# Patient Record
Sex: FEMALE | Race: BLACK OR AFRICAN AMERICAN | NOT HISPANIC OR LATINO
[De-identification: names, ages, dates, MRNs, and addresses within clinical notes are randomized per-mention and may not be internally consistent; named-entity substitution may affect disease eponyms.]

---

## 2022-08-23 PROBLEM — Z00.00 ENCOUNTER FOR PREVENTIVE HEALTH EXAMINATION: Status: ACTIVE | Noted: 2022-08-23

## 2022-08-25 ENCOUNTER — APPOINTMENT (OUTPATIENT)
Dept: OTOLARYNGOLOGY | Facility: CLINIC | Age: 42
End: 2022-08-25

## 2022-08-25 ENCOUNTER — NON-APPOINTMENT (OUTPATIENT)
Age: 42
End: 2022-08-25

## 2022-08-25 VITALS
WEIGHT: 165 LBS | SYSTOLIC BLOOD PRESSURE: 157 MMHG | BODY MASS INDEX: 30.36 KG/M2 | HEART RATE: 79 BPM | DIASTOLIC BLOOD PRESSURE: 86 MMHG | TEMPERATURE: 97.3 F | OXYGEN SATURATION: 97 % | HEIGHT: 62 IN

## 2022-08-25 DIAGNOSIS — Z82.49 FAMILY HISTORY OF ISCHEMIC HEART DISEASE AND OTHER DISEASES OF THE CIRCULATORY SYSTEM: ICD-10-CM

## 2022-08-25 DIAGNOSIS — Z83.3 FAMILY HISTORY OF DIABETES MELLITUS: ICD-10-CM

## 2022-08-25 DIAGNOSIS — Z80.9 FAMILY HISTORY OF MALIGNANT NEOPLASM, UNSPECIFIED: ICD-10-CM

## 2022-08-25 DIAGNOSIS — H61.23 IMPACTED CERUMEN, BILATERAL: ICD-10-CM

## 2022-08-25 DIAGNOSIS — Z86.39 PERSONAL HISTORY OF OTHER ENDOCRINE, NUTRITIONAL AND METABOLIC DISEASE: ICD-10-CM

## 2022-08-25 DIAGNOSIS — Z78.9 OTHER SPECIFIED HEALTH STATUS: ICD-10-CM

## 2022-08-25 DIAGNOSIS — D64.9 ANEMIA, UNSPECIFIED: ICD-10-CM

## 2022-08-25 PROCEDURE — 69210 REMOVE IMPACTED EAR WAX UNI: CPT

## 2022-08-25 PROCEDURE — 10061 I&D ABSCESS COMP/MULTIPLE: CPT

## 2022-08-25 PROCEDURE — 99204 OFFICE O/P NEW MOD 45 MIN: CPT | Mod: 25

## 2022-08-25 RX ORDER — AMOXICILLIN AND CLAVULANATE POTASSIUM 875; 125 MG/1; MG/1
875-125 TABLET, COATED ORAL
Qty: 14 | Refills: 0 | Status: ACTIVE | COMMUNITY
Start: 2022-08-25 | End: 1900-01-01

## 2022-08-25 RX ORDER — PREDNISONE 50 MG/1
50 TABLET ORAL
Qty: 5 | Refills: 0 | Status: ACTIVE | COMMUNITY
Start: 2022-08-25 | End: 1900-01-01

## 2022-08-25 NOTE — PROCEDURE
[FreeTextEntry1] : I&D R preauricular abscess [FreeTextEntry2] : R preauricular abscess [FreeTextEntry3] : The risks & benefits of incision & drainage were discussed and the patient consented in writing. A time-out was performed. After an alcohol prep, he area under & around the abscess was injected with a judicious amount of 1% lidocaine w/ epi and time was allowed for this to take effect. The area was draped and a betadine prep was performed. The cavity was then sharply opened with a blade and probed with a hemostat revealing a large phlegmon with areas of abscess; a culture was taken, purulent contents were evacuated and the cavity was irrigated with sterile saline. It was then packed w/ 1/4" iodoform packing tape and dressed with 4x4s. This was tolerated well.\par Care was discussed and instructions & supplies were given for BID packing changes.

## 2022-08-25 NOTE — ASSESSMENT
[FreeTextEntry1] : We discussed the combination phlegmon/abscess nature of the findings; since this was not a simple abscess with proceed w/ abx & steroids. RTC 4 days; she expresses understanding of how to have someone perform dressing changes.

## 2022-08-25 NOTE — PHYSICAL EXAM
[Binocular Microscopic Exam] : Binocular microscopic exam was performed [FreeTextEntry6] : 3.5 cm L preauricular fluctuant mass  [FreeTextEntry8] : obstructing cerumen removed with suction [FreeTextEntry9] : obstructing cerumen removed with suction [Normal] : assessment of respiratory effort is normal

## 2022-08-25 NOTE — HISTORY OF PRESENT ILLNESS
[de-identified] : Hx of excision of a L preauricular pit and now presents w/ a R preauricular pit that became inflamed ~3 wks ago- she was prescribed amox/clav for 10 days with some improvement but then it swelled up 3d ago and since then has decreased in size somewhat. Only mild pain at this point. No abx now. No hx surgery on the R.

## 2022-08-29 ENCOUNTER — APPOINTMENT (OUTPATIENT)
Dept: OTOLARYNGOLOGY | Facility: CLINIC | Age: 42
End: 2022-08-29

## 2022-08-29 VITALS
SYSTOLIC BLOOD PRESSURE: 146 MMHG | HEART RATE: 91 BPM | OXYGEN SATURATION: 97 % | BODY MASS INDEX: 30.36 KG/M2 | WEIGHT: 165 LBS | HEIGHT: 62 IN | TEMPERATURE: 97.6 F | DIASTOLIC BLOOD PRESSURE: 86 MMHG

## 2022-08-29 LAB — EAR NOSE AND THROAT CULTURE: ABNORMAL

## 2022-08-29 PROCEDURE — 99024 POSTOP FOLLOW-UP VISIT: CPT

## 2022-08-29 NOTE — REASON FOR VISIT
[Subsequent Evaluation] : a subsequent evaluation for [FreeTextEntry2] : f/u I&D of a large R supra-auricular abscess

## 2022-08-29 NOTE — ASSESSMENT
[FreeTextEntry1] : On questioning she's been packing the cavity very tightly and will lighten up a bit. We discussed when to stop packing. RTC 3 wks sooner prn

## 2022-08-29 NOTE — HISTORY OF PRESENT ILLNESS
[de-identified] : Hx of excision of a L preauricular pit and now f/u I&D of a R preauricular pit that abscessed widely. She's been doing BID packing changes with significant relief. No pain.

## 2022-09-19 ENCOUNTER — APPOINTMENT (OUTPATIENT)
Dept: OTOLARYNGOLOGY | Facility: CLINIC | Age: 42
End: 2022-09-19

## 2022-09-19 VITALS
BODY MASS INDEX: 30.36 KG/M2 | DIASTOLIC BLOOD PRESSURE: 93 MMHG | HEART RATE: 84 BPM | WEIGHT: 165 LBS | HEIGHT: 62 IN | SYSTOLIC BLOOD PRESSURE: 159 MMHG | TEMPERATURE: 98.4 F | OXYGEN SATURATION: 98 %

## 2022-09-19 PROCEDURE — 92557 COMPREHENSIVE HEARING TEST: CPT

## 2022-09-19 PROCEDURE — 92567 TYMPANOMETRY: CPT

## 2022-09-19 PROCEDURE — 99214 OFFICE O/P EST MOD 30 MIN: CPT | Mod: 25

## 2022-09-19 PROCEDURE — 92504 EAR MICROSCOPY EXAMINATION: CPT

## 2022-09-19 NOTE — HISTORY OF PRESENT ILLNESS
[de-identified] : Hx of excision of a L preauricular pit and now f/u I&D of a R preauricular pit that abscessed widely. No further pain; hearing seems ok & no tinnitus.

## 2022-09-19 NOTE — DATA REVIEWED
[de-identified] : today: normal hearing AU\par - immitance testing w/ type A AU\par  [de-identified] : 8/22 cx: few Provotella, few Peptoniphilus

## 2022-09-19 NOTE — REASON FOR VISIT
[Subsequent Evaluation] : a subsequent evaluation for [FreeTextEntry2] : f/u large R periauricular abscess

## 2022-09-19 NOTE — PHYSICAL EXAM
[Binocular Microscopic Exam] : Binocular microscopic exam was performed [Normal] : the left middle ear was normal [FreeTextEntry6] : thick scar w/ underlying 1 cm prominence anterior to the R helical root

## 2022-09-23 LAB
ALBUMIN SERPL ELPH-MCNC: 4.3 G/DL
ALP BLD-CCNC: 77 U/L
ALT SERPL-CCNC: 15 U/L
ANION GAP SERPL CALC-SCNC: 12 MMOL/L
AST SERPL-CCNC: 19 U/L
BASOPHILS # BLD AUTO: 0.05 K/UL
BASOPHILS NFR BLD AUTO: 0.7 %
BILIRUB SERPL-MCNC: 0.2 MG/DL
BUN SERPL-MCNC: 8 MG/DL
CALCIUM SERPL-MCNC: 9.5 MG/DL
CHLORIDE SERPL-SCNC: 105 MMOL/L
CO2 SERPL-SCNC: 21 MMOL/L
CREAT SERPL-MCNC: 0.63 MG/DL
EGFR: 116 ML/MIN/1.73M2
EOSINOPHIL # BLD AUTO: 0.15 K/UL
EOSINOPHIL NFR BLD AUTO: 2.1 %
GLUCOSE SERPL-MCNC: 103 MG/DL
HCT VFR BLD CALC: 36.6 %
HGB BLD-MCNC: 10.8 G/DL
IMM GRANULOCYTES NFR BLD AUTO: 0.3 %
INR PPP: 1.08 RATIO
LYMPHOCYTES # BLD AUTO: 1.72 K/UL
LYMPHOCYTES NFR BLD AUTO: 24.2 %
MAN DIFF?: NORMAL
MCHC RBC-ENTMCNC: 27.3 PG
MCHC RBC-ENTMCNC: 29.5 GM/DL
MCV RBC AUTO: 92.7 FL
MONOCYTES # BLD AUTO: 0.49 K/UL
MONOCYTES NFR BLD AUTO: 6.9 %
NEUTROPHILS # BLD AUTO: 4.68 K/UL
NEUTROPHILS NFR BLD AUTO: 65.8 %
PLATELET # BLD AUTO: 585 K/UL
POTASSIUM SERPL-SCNC: 4.2 MMOL/L
PROT SERPL-MCNC: 7.3 G/DL
PT BLD: 12.7 SEC
RBC # BLD: 3.95 M/UL
RBC # FLD: 14.4 %
SODIUM SERPL-SCNC: 139 MMOL/L
WBC # FLD AUTO: 7.11 K/UL

## 2022-10-07 ENCOUNTER — APPOINTMENT (OUTPATIENT)
Dept: CT IMAGING | Facility: CLINIC | Age: 42
End: 2022-10-07

## 2022-11-16 ENCOUNTER — TRANSCRIPTION ENCOUNTER (OUTPATIENT)
Age: 42
End: 2022-11-16

## 2022-11-16 ENCOUNTER — RESULT REVIEW (OUTPATIENT)
Age: 42
End: 2022-11-16

## 2022-11-16 ENCOUNTER — OUTPATIENT (OUTPATIENT)
Dept: OUTPATIENT SERVICES | Facility: HOSPITAL | Age: 42
LOS: 1 days | End: 2022-11-16

## 2022-11-16 ENCOUNTER — APPOINTMENT (OUTPATIENT)
Dept: CT IMAGING | Facility: CLINIC | Age: 42
End: 2022-11-16

## 2022-11-16 PROCEDURE — 70481 CT ORBIT/EAR/FOSSA W/DYE: CPT | Mod: 26

## 2022-12-12 ENCOUNTER — APPOINTMENT (OUTPATIENT)
Dept: OTOLARYNGOLOGY | Facility: CLINIC | Age: 42
End: 2022-12-12

## 2022-12-12 VITALS
DIASTOLIC BLOOD PRESSURE: 68 MMHG | OXYGEN SATURATION: 100 % | HEART RATE: 76 BPM | TEMPERATURE: 98 F | HEIGHT: 62 IN | BODY MASS INDEX: 30.73 KG/M2 | WEIGHT: 167 LBS | SYSTOLIC BLOOD PRESSURE: 154 MMHG

## 2022-12-12 DIAGNOSIS — Q18.1 PREAURICULAR SINUS AND CYST: ICD-10-CM

## 2022-12-12 PROCEDURE — 92504 EAR MICROSCOPY EXAMINATION: CPT

## 2022-12-12 PROCEDURE — 99213 OFFICE O/P EST LOW 20 MIN: CPT | Mod: 25

## 2022-12-12 NOTE — DATA REVIEWED
[de-identified] : 9/22: normal hearing AU\par - immitance testing w/ type A AU\par  [de-identified] : CT t-bones: inflammatory soft tissue fullness in the suspected area w/ a sinus tract

## 2022-12-12 NOTE — HISTORY OF PRESENT ILLNESS
[de-identified] : Hx of excision of a L preauricular pit and now f/u a CT subsequent to I&D of a R preauricular pit that abscessed widely. No further pain; hearing seems ok & no tinnitus.

## 2022-12-12 NOTE — PHYSICAL EXAM
[Binocular Microscopic Exam] : Binocular microscopic exam was performed [Normal] : the left middle ear was normal [FreeTextEntry6] : thick scar w/ underlying 1 cm prominence anterior to the R helical root- unchanged [FreeTextEntry7] : well-healed pit excision site

## 2023-01-09 ENCOUNTER — APPOINTMENT (OUTPATIENT)
Dept: OTOLARYNGOLOGY | Facility: CLINIC | Age: 43
End: 2023-01-09

## 2023-01-18 ENCOUNTER — APPOINTMENT (OUTPATIENT)
Age: 43
End: 2023-01-18

## 2023-01-23 ENCOUNTER — APPOINTMENT (OUTPATIENT)
Dept: OTOLARYNGOLOGY | Facility: CLINIC | Age: 43
End: 2023-01-23

## 2023-11-05 ENCOUNTER — EMERGENCY (EMERGENCY)
Facility: HOSPITAL | Age: 43
LOS: 1 days | Discharge: HOME | End: 2023-11-05
Attending: EMERGENCY MEDICINE | Admitting: EMERGENCY MEDICINE
Payer: COMMERCIAL

## 2023-11-05 VITALS
SYSTOLIC BLOOD PRESSURE: 156 MMHG | DIASTOLIC BLOOD PRESSURE: 98 MMHG | WEIGHT: 179.9 LBS | HEART RATE: 98 BPM | RESPIRATION RATE: 16 BRPM | OXYGEN SATURATION: 100 % | HEIGHT: 62 IN | TEMPERATURE: 98 F

## 2023-11-05 DIAGNOSIS — H92.01 OTALGIA, RIGHT EAR: ICD-10-CM

## 2023-11-05 DIAGNOSIS — Z86.2 PERSONAL HISTORY OF DISEASES OF THE BLOOD AND BLOOD-FORMING ORGANS AND CERTAIN DISORDERS INVOLVING THE IMMUNE MECHANISM: ICD-10-CM

## 2023-11-05 DIAGNOSIS — Z87.42 PERSONAL HISTORY OF OTHER DISEASES OF THE FEMALE GENITAL TRACT: ICD-10-CM

## 2023-11-05 DIAGNOSIS — R05.1 ACUTE COUGH: ICD-10-CM

## 2023-11-05 DIAGNOSIS — Z87.2 PERSONAL HISTORY OF DISEASES OF THE SKIN AND SUBCUTANEOUS TISSUE: ICD-10-CM

## 2023-11-05 DIAGNOSIS — Q18.1 PREAURICULAR SINUS AND CYST: ICD-10-CM

## 2023-11-05 DIAGNOSIS — H93.8X1 OTHER SPECIFIED DISORDERS OF RIGHT EAR: ICD-10-CM

## 2023-11-05 PROCEDURE — 99283 EMERGENCY DEPT VISIT LOW MDM: CPT

## 2023-11-05 RX ADMIN — Medication 1 TABLET(S): at 01:50

## 2023-11-05 NOTE — ED PROVIDER NOTE - PHYSICAL EXAMINATION
Physical Exam:    CONSTITUTIONAL:  Generally well appearing, no acute distress, alert, awake and oriented  HEENT:  Moist mucous membranes, normal voice  PULM:  No accessory muscle use, speaking full sentences  SKIN:  Normal in appearance, normal color    2x2cm area of sofr fluctuance and ttp just anterior/superior to R ear. no pain w/ ear manipulation. central scar (pt states this is from last incision to drain the abscess). No crepitus, firmness, induration. Skin is normal temp. No erythema/warmth. No obvious skin breaks.   TM/EAC wnl. Physical Exam:    CONSTITUTIONAL:  Generally well appearing, no acute distress, alert, awake and oriented  HEENT:  Moist mucous membranes, normal voice  PULM:  No accessory muscle use, speaking full sentences  SKIN:  Normal in appearance, normal color    2x2cm area of soft fluctuance and minimal ttp just anterior/superior to R ear. no pain w/ ear manipulation. central scar (pt states this is from last incision to drain the abscess). No crepitus, firmness, induration. Skin is normal temp. No erythema/warmth. No obvious skin breaks.   TM/EAC wnl.

## 2023-11-05 NOTE — ED PROVIDER NOTE - NSFOLLOWUPINSTRUCTIONS_ED_ALL_ED_FT
It is unclear what exactly is causing your symptoms! You may have a cyst or a lipoma. It is important to continue following up with your doctor outside the hospital and to return to ER for: Persistent fever/vomiting, uncontrolled pain, worsening swelling, worsening breathing, worsening lightheaded, spreading redness.     Follow up with ENT. Can follow up at [facility] ([facility] Can call [phone] to schedule appointment.     Can take tylenol 650mg or motrin 600mg (May cause stomach irritation - take with food and avoid prolonged use) every 6hrs as needed for pain or fever.    Take antibiotics as prescribed for possible infection.     Follow up with primary doctor within 1-2 days.

## 2023-11-05 NOTE — ED ADULT NURSE NOTE - HISTORY OF COVID-19 VACCINATION
Individual Psychotherapy (PhD/LCSW)    3/11/2022    Site:  [place]         Therapeutic Intervention: Met with patient.  Outpatient - Insight oriented psychotherapy 45 min - CPT code 79838, Outpatient - Behavior modifying psychotherapy 45 min - CPT code 22168 and Outpatient - Supportive psychotherapy 45 min - CPT Code 44084    Chief complaint/reason for encounter: depression, anxiety and family stress             Interval history and content of current session:  Client was referred to treatment by Apryl REHMAN to address depression and anxiety.  Client arrived to session late and was fully engaged.  Client shared that her sister  yesterday.  Client was tearful at times through session which is understandable.  Client went on to discuss her estranged relationship with her family and how she wishes that her family would come together.  She shared about her son asking her never to contact him again.  Client is deeply hurt and saddened by this.  She reports that she continues to pray about it, talk to her , and attend counseling.  Client reported that she and her niece, her sister's daughter, get along well and are very supportive with none of one another. Client reports that she plans to go out to visit her niece this weekend.  Client to continue in one-to-one sessions.    Treatment plan:  · Target symptoms: depression, anxiety , work stress, family stress  · Why chosen therapy is appropriate versus another modality: relevant to diagnosis, patient responds to this modality, evidence based practice  · Outcome monitoring methods: self-report  · Therapeutic intervention type: insight oriented psychotherapy, behavior modifying psychotherapy, supportive psychotherapy    Risk parameters:  Patient reports no suicidal ideation  Patient reports no homicidal ideation  Patient reports no self-injurious behavior  Patient reports no violent behavior          CSSRS was completed:     Verbal deficits: None    Patient's  response to intervention:  The patient's response to intervention is accepting.    Progress toward goals and other mental status changes:  The patient's progress toward goals is fair , good.    Diagnosis:     ICD-10-CM ICD-9-CM   1. VANDANA (generalized anxiety disorder)  F41.1 300.02   2. Moderate episode of recurrent major depressive disorder  F33.1 296.32   3. Family conflict  Z63.8 V61.9       Plan:  individual psychotherapy and Ct to continue to see Apryl REHMAN for psych med mgt Pt to go to ED or call 911 if symptoms worsen or if she has thoughts of harming self and/or others. Pt verbalized understanding.    Return to clinic: as scheduled    Length of Service (minutes): 45      Each patient to whom he or she provides medical services by telemedicine is: (1) informed of the relationship between the physician and patient and the respective role of any other health care provider with respect to management of the patient; and (2) notified that he or she may decline to receive medical services by telemedicine and may withdraw from such care at any time.       Yes

## 2023-11-05 NOTE — ED PROVIDER NOTE - CLINICAL SUMMARY MEDICAL DECISION MAKING FREE TEXT BOX
42F PMH fibroids, anemia (2/2 fibroids), preauricular pits p/w pain/swelling to area just anterior/superior to R ear since yesterday - states this is the area of her preauricular pit and has had abscess there in the past, last drained ~1yr ago. Has minimal cough x2w. No other systemic symptoms.   Vitals wnl, exam as above. 42F PMH fibroids, anemia (2/2 fibroids), preauricular pits p/w pain/swelling to area just anterior/superior to R ear since yesterday - states this is the area of her preauricular pit and has had abscess there in the past, last drained ~1yr ago. Has minimal cough x2w. Pt states that at her baseline, she has no swelling to that area.  No other systemic symptoms.   Vitals wnl, exam as above.  I instilled 0.5cc of 1% lido w/ epi and made small incision - there is no drainage at all. very scant blood.    After questioning pt if all this swelling really just happened since yesterday, pt now states that she does have swelling at baseline but slightly worse since yesterday.  Initially, based on pt's initial description, symptoms were c/w possible abscess. However based on incision and pt now stating that this has been there a while, this is likely not abscess, but rather possible cyst/lipoma. Will cover w/ abx for possible infection.

## 2023-11-05 NOTE — ED PROVIDER NOTE - PATIENT PORTAL LINK FT
You can access the FollowMyHealth Patient Portal offered by [facility] by registering at the following website: [email or web address]. By joining "[employer]’s FollowMyHealth portal, you will also be able to view your health information using other applications (apps) compatible with our system.

## 2023-11-05 NOTE — ED PROVIDER NOTE - OBJECTIVE STATEMENT
42F PMH fibroids, anemia (2/2 fibroids), preauricular pits p/w pain/swelling to area just anterior/superior to R ear since yesterday - states this is the area of her preauricular pit and has had abscess there in the past, last drained ~1yr ago. Has minimal cough x2w. No other systemic symptoms.   Denies pain to other parts of ear, hearing changes, HA, dizziness, vision changes, focal weakness/numbness, gait changes, NVD, f/c.

## 2023-11-05 NOTE — ED ADULT NURSE NOTE - NSFALLUNIVINTERV_ED_ALL_ED
Bed/Stretcher in lowest position, wheels locked, appropriate side rails in place/Call bell, personal items and telephone in reach/Instruct patient to call for assistance before getting out of bed/chair/stretcher/Non-slip footwear applied when patient is off stretcher/Early to call system/Physically safe environment - no spills, clutter or unnecessary equipment/Purposeful proactive rounding/Room/bathroom lighting operational, light cord in reach

## 2025-03-04 ENCOUNTER — APPOINTMENT (OUTPATIENT)
Dept: OTOLARYNGOLOGY | Facility: CLINIC | Age: 45
End: 2025-03-04
Payer: COMMERCIAL

## 2025-03-04 ENCOUNTER — NON-APPOINTMENT (OUTPATIENT)
Age: 45
End: 2025-03-04

## 2025-03-04 VITALS
WEIGHT: 185.56 LBS | SYSTOLIC BLOOD PRESSURE: 160 MMHG | DIASTOLIC BLOOD PRESSURE: 90 MMHG | BODY MASS INDEX: 34.15 KG/M2 | OXYGEN SATURATION: 99 % | TEMPERATURE: 98.2 F | HEIGHT: 62 IN | HEART RATE: 77 BPM

## 2025-03-04 DIAGNOSIS — D64.9 ANEMIA, UNSPECIFIED: ICD-10-CM

## 2025-03-04 PROCEDURE — 99214 OFFICE O/P EST MOD 30 MIN: CPT | Mod: 25

## 2025-03-04 PROCEDURE — 69000 DRG XTRNL EAR ABSC/HEM SMPL: CPT | Mod: RT

## 2025-03-04 RX ORDER — LISINOPRIL 40 MG/1
40 TABLET ORAL
Refills: 0 | Status: ACTIVE | COMMUNITY

## 2025-03-10 ENCOUNTER — APPOINTMENT (OUTPATIENT)
Dept: OTOLARYNGOLOGY | Facility: CLINIC | Age: 45
End: 2025-03-10
Payer: COMMERCIAL

## 2025-03-10 VITALS
SYSTOLIC BLOOD PRESSURE: 140 MMHG | BODY MASS INDEX: 34.04 KG/M2 | OXYGEN SATURATION: 97 % | TEMPERATURE: 97.6 F | WEIGHT: 185 LBS | HEART RATE: 98 BPM | HEIGHT: 62 IN | DIASTOLIC BLOOD PRESSURE: 88 MMHG

## 2025-03-10 DIAGNOSIS — Q18.1 PREAURICULAR SINUS AND CYST: ICD-10-CM

## 2025-03-10 PROCEDURE — 99024 POSTOP FOLLOW-UP VISIT: CPT

## 2025-03-25 ENCOUNTER — APPOINTMENT (OUTPATIENT)
Dept: OTOLARYNGOLOGY | Facility: CLINIC | Age: 45
End: 2025-03-25

## 2025-04-14 ENCOUNTER — APPOINTMENT (OUTPATIENT)
Dept: OTOLARYNGOLOGY | Facility: CLINIC | Age: 45
End: 2025-04-14
Payer: COMMERCIAL

## 2025-04-14 VITALS
BODY MASS INDEX: 34.6 KG/M2 | TEMPERATURE: 97.6 F | OXYGEN SATURATION: 98 % | HEIGHT: 62 IN | DIASTOLIC BLOOD PRESSURE: 92 MMHG | WEIGHT: 188 LBS | SYSTOLIC BLOOD PRESSURE: 166 MMHG | HEART RATE: 95 BPM

## 2025-04-14 DIAGNOSIS — D64.9 ANEMIA, UNSPECIFIED: ICD-10-CM

## 2025-04-14 DIAGNOSIS — Q18.1 PREAURICULAR SINUS AND CYST: ICD-10-CM

## 2025-04-14 PROCEDURE — 92504 EAR MICROSCOPY EXAMINATION: CPT

## 2025-04-14 PROCEDURE — 99214 OFFICE O/P EST MOD 30 MIN: CPT | Mod: 25

## 2025-06-25 ENCOUNTER — APPOINTMENT (OUTPATIENT)
Dept: OTOLARYNGOLOGY | Facility: CLINIC | Age: 45
End: 2025-06-25
Payer: COMMERCIAL

## 2025-06-25 PROCEDURE — G2211 COMPLEX E/M VISIT ADD ON: CPT | Mod: NC,93

## 2025-06-25 PROCEDURE — 99215 OFFICE O/P EST HI 40 MIN: CPT | Mod: 93

## 2025-06-26 RX ORDER — BACITRACIN 500 [IU]/G
500 OINTMENT TOPICAL
Qty: 1 | Refills: 0 | Status: ACTIVE | COMMUNITY
Start: 2025-06-26 | End: 1900-01-01

## 2025-07-08 NOTE — ASU PATIENT PROFILE, ADULT - NS PREOP UNDERSTANDS INFO
No solid food/dairy/candy/gum after midnight; water allowed before 07:00am tomorrow; patient reminded to come with photo ID/insurance/credit card; dress in comfortable clothes; no jewelries/contact lens/valuable; no smoking/alcohol drinking/recreational drug use tonight; escort to have photo ID; address and callback number was given/yes

## 2025-07-08 NOTE — ASU PATIENT PROFILE, ADULT - ABLE TO REACH PT
yes Time change left on voicemail; instructed to arrive at 06:45am; No solid food/dairy/candy/gum after midnight; water allowed before 05:30am tomorrow; patient reminded to come with photo ID/insurance/credit card; dress in comfortable clothes; no jewelries/contact lens/valuable; no smoking/alcohol drinking/recreational drug use tonight; escort to have photo ID; address and callback number was given/no

## 2025-07-09 ENCOUNTER — TRANSCRIPTION ENCOUNTER (OUTPATIENT)
Age: 45
End: 2025-07-09

## 2025-07-09 ENCOUNTER — OUTPATIENT (OUTPATIENT)
Dept: OUTPATIENT SERVICES | Facility: HOSPITAL | Age: 45
LOS: 1 days | Discharge: HOME | End: 2025-07-09
Payer: COMMERCIAL

## 2025-07-09 ENCOUNTER — RESULT REVIEW (OUTPATIENT)
Age: 45
End: 2025-07-09

## 2025-07-09 ENCOUNTER — APPOINTMENT (OUTPATIENT)
Dept: OTOLARYNGOLOGY | Facility: AMBULATORY SURGERY CENTER | Age: 45
End: 2025-07-09

## 2025-07-09 VITALS
HEART RATE: 89 BPM | DIASTOLIC BLOOD PRESSURE: 82 MMHG | TEMPERATURE: 99 F | WEIGHT: 186.51 LBS | HEIGHT: 62 IN | RESPIRATION RATE: 16 BRPM | SYSTOLIC BLOOD PRESSURE: 152 MMHG | OXYGEN SATURATION: 100 %

## 2025-07-09 VITALS
RESPIRATION RATE: 16 BRPM | HEART RATE: 76 BPM | SYSTOLIC BLOOD PRESSURE: 105 MMHG | OXYGEN SATURATION: 99 % | DIASTOLIC BLOOD PRESSURE: 62 MMHG

## 2025-07-09 PROCEDURE — 11444 EXC FACE-MM B9+MARG 3.1-4 CM: CPT | Mod: GC

## 2025-07-09 PROCEDURE — 13152 CMPLX RPR E/N/E/L 2.6-7.5 CM: CPT | Mod: GC

## 2025-07-09 PROCEDURE — 88304 TISSUE EXAM BY PATHOLOGIST: CPT | Mod: 26

## 2025-07-09 DEVICE — SURGIFOAM 8 X 12.5CM X 10MM (100): Type: IMPLANTABLE DEVICE | Site: RIGHT | Status: FUNCTIONAL

## 2025-07-09 RX ORDER — LISINOPRIL 5 MG/1
1 TABLET ORAL
Refills: 0 | DISCHARGE

## 2025-07-09 RX ORDER — OXYCODONE HYDROCHLORIDE 30 MG/1
5 TABLET ORAL ONCE
Refills: 0 | Status: DISCONTINUED | OUTPATIENT
Start: 2025-07-09 | End: 2025-07-09

## 2025-07-09 RX ORDER — DIPHENHYDRAMINE HCL 12.5MG/5ML
12.5 ELIXIR ORAL ONCE
Refills: 0 | Status: DISCONTINUED | OUTPATIENT
Start: 2025-07-09 | End: 2025-07-09

## 2025-07-09 RX ORDER — APREPITANT 40 MG/1
40 CAPSULE ORAL ONCE
Refills: 0 | Status: COMPLETED | OUTPATIENT
Start: 2025-07-09 | End: 2025-07-09

## 2025-07-09 RX ORDER — ONDANSETRON HCL/PF 4 MG/2 ML
4 VIAL (ML) INJECTION ONCE
Refills: 0 | Status: DISCONTINUED | OUTPATIENT
Start: 2025-07-09 | End: 2025-07-09

## 2025-07-09 RX ORDER — FENTANYL CITRATE-0.9 % NACL/PF 100MCG/2ML
25 SYRINGE (ML) INTRAVENOUS
Refills: 0 | Status: DISCONTINUED | OUTPATIENT
Start: 2025-07-09 | End: 2025-07-09

## 2025-07-09 RX ORDER — SODIUM CHLORIDE 9 G/1000ML
500 INJECTION, SOLUTION INTRAVENOUS
Refills: 0 | Status: DISCONTINUED | OUTPATIENT
Start: 2025-07-09 | End: 2025-07-09

## 2025-07-09 RX ORDER — CELECOXIB 50 MG/1
200 CAPSULE ORAL ONCE
Refills: 0 | Status: DISCONTINUED | OUTPATIENT
Start: 2025-07-09 | End: 2025-07-09

## 2025-07-09 RX ORDER — ACETAMINOPHEN 500 MG/5ML
1000 LIQUID (ML) ORAL ONCE
Refills: 0 | Status: COMPLETED | OUTPATIENT
Start: 2025-07-09 | End: 2025-07-09

## 2025-07-09 RX ADMIN — Medication 1000 MILLIGRAM(S): at 07:40

## 2025-07-09 RX ADMIN — APREPITANT 40 MILLIGRAM(S): 40 CAPSULE ORAL at 07:40

## 2025-07-09 NOTE — ASU DISCHARGE PLAN (ADULT/PEDIATRIC) - CARE PROVIDER_API CALL
Stephane Sanchez ()  Otolaryngology Head And Neck Surgery  [address] [place] [address], [address]  [place] [address]  Phone: [phone]  Fax: [phone]  Follow Up Time:

## 2025-07-09 NOTE — ASU DISCHARGE PLAN (ADULT/PEDIATRIC) - FINANCIAL ASSISTANCE
[facility] provides services at a reduced cost to those who are determined to be eligible through [facility]’s financial assistance program. Information regarding [facility]’s financial assistance program can be found by going to [email or web address] or by calling [phone].

## 2025-07-21 ENCOUNTER — APPOINTMENT (OUTPATIENT)
Dept: OTOLARYNGOLOGY | Facility: CLINIC | Age: 45
End: 2025-07-21
Payer: COMMERCIAL

## 2025-07-21 VITALS
BODY MASS INDEX: 34.6 KG/M2 | SYSTOLIC BLOOD PRESSURE: 135 MMHG | OXYGEN SATURATION: 99 % | WEIGHT: 188 LBS | DIASTOLIC BLOOD PRESSURE: 84 MMHG | HEART RATE: 88 BPM | HEIGHT: 62 IN | TEMPERATURE: 98.4 F

## 2025-07-21 DIAGNOSIS — Q18.1 PREAURICULAR SINUS AND CYST: ICD-10-CM

## 2025-07-21 PROBLEM — E78.5 HYPERLIPIDEMIA, UNSPECIFIED: Chronic | Status: ACTIVE | Noted: 2025-07-09

## 2025-07-21 PROBLEM — D64.9 ANEMIA, UNSPECIFIED: Chronic | Status: ACTIVE | Noted: 2025-07-09

## 2025-07-21 PROBLEM — I10 ESSENTIAL (PRIMARY) HYPERTENSION: Chronic | Status: ACTIVE | Noted: 2025-07-09

## 2025-07-21 PROCEDURE — 99024 POSTOP FOLLOW-UP VISIT: CPT

## (undated) DEVICE — DRSG XEROFORM 1 X 8"

## (undated) DEVICE — SUT VICRYL PLUS 3-0 27" RB-1 UNDYED

## (undated) DEVICE — PACKING GAUZE IODOFORM 1"

## (undated) DEVICE — TUBING SUCTION NONCONDUCTIVE 6MM X 12FT

## (undated) DEVICE — STRYKER COLORADO N-SERIES 3CM STRAIGHT

## (undated) DEVICE — PACKING 1/2"

## (undated) DEVICE — PETRI DISH MED 3.5"

## (undated) DEVICE — DRSG TELFA 3 X 8

## (undated) DEVICE — SUT MONOCRYL 4-0 18" P-3 UNDYED

## (undated) DEVICE — SYR ASEPTO

## (undated) DEVICE — BEAVER BLADE MINI (ORANGE)

## (undated) DEVICE — SUT VICRYL 2-0 27" SH UNDYED

## (undated) DEVICE — WARMING BLANKET UPPER ADULT

## (undated) DEVICE — PREP BETADINE KIT

## (undated) DEVICE — PACK BLEPHAROPLASTY

## (undated) DEVICE — SUT PLAIN GUT FAST ABSORBING 5-0 PC-1

## (undated) DEVICE — VENODYNE/SCD SLEEVE CALF MEDIUM

## (undated) DEVICE — GLV 7.5 PROTEXIS (WHITE)